# Patient Record
Sex: FEMALE | Race: OTHER | Employment: OTHER | URBAN - METROPOLITAN AREA
[De-identification: names, ages, dates, MRNs, and addresses within clinical notes are randomized per-mention and may not be internally consistent; named-entity substitution may affect disease eponyms.]

---

## 2022-02-03 ENCOUNTER — NEW PATIENT (OUTPATIENT)
Dept: URBAN - METROPOLITAN AREA CLINIC 9 | Facility: CLINIC | Age: 52
End: 2022-02-03

## 2022-02-03 DIAGNOSIS — H25.13: ICD-10-CM

## 2022-02-03 DIAGNOSIS — H04.123: ICD-10-CM

## 2022-02-03 PROCEDURE — 92004 COMPRE OPH EXAM NEW PT 1/>: CPT

## 2022-02-03 ASSESSMENT — VISUAL ACUITY
OS_SC: J3
OD_SC: 20/25
OD_SC: J3
OS_SC: 20/25

## 2022-02-03 ASSESSMENT — TONOMETRY
OD_IOP_MMHG: 20
OS_IOP_MMHG: 20

## 2022-02-03 NOTE — PATIENT DISCUSSION
Encourage patient to increase light and hold things further away from nose. Do not recommend patient start using OTC readers until near work is impossible. Once starting near work with readers, you become more dependent on them.

## 2022-02-03 NOTE — PATIENT DISCUSSION
No Surgery at this time: Cataracts noted during exam. Patient is currently not bothered by the visual symptoms that occur with cataracts. Review at next appointment.

## 2022-07-05 RX ORDER — DROSPIRENONE AND ETHINYL ESTRADIOL 0.03MG-3MG
KIT ORAL
COMMUNITY

## 2022-07-05 RX ORDER — ZOLPIDEM TARTRATE 10 MG/1
TABLET ORAL
COMMUNITY
End: 2022-08-09

## 2022-07-05 RX ORDER — DIAZEPAM 5 MG/1
TABLET ORAL
COMMUNITY

## 2022-07-05 RX ORDER — CLONAZEPAM 0.5 MG/1
TABLET ORAL
COMMUNITY

## 2022-07-05 RX ORDER — HYDROCHLOROTHIAZIDE 25 MG/1
TABLET ORAL
COMMUNITY
End: 2022-11-02

## 2022-07-05 RX ORDER — RIZATRIPTAN BENZOATE 10 MG/1
TABLET ORAL
COMMUNITY

## 2022-08-09 PROBLEM — E04.1 THYROID NODULE: Status: ACTIVE | Noted: 2022-08-09

## 2022-08-09 PROBLEM — F41.8 SITUATIONAL ANXIETY: Status: ACTIVE | Noted: 2022-08-09

## 2022-08-09 PROBLEM — F51.04 CHRONIC INSOMNIA: Status: ACTIVE | Noted: 2022-08-09

## 2022-08-09 PROBLEM — I10 ESSENTIAL HYPERTENSION: Status: ACTIVE | Noted: 2022-08-09

## 2022-08-09 PROBLEM — N92.0 EXCESSIVE AND FREQUENT MENSTRUATION: Status: ACTIVE | Noted: 2022-08-09
